# Patient Record
Sex: MALE | Race: WHITE | NOT HISPANIC OR LATINO | Employment: UNEMPLOYED | URBAN - METROPOLITAN AREA
[De-identification: names, ages, dates, MRNs, and addresses within clinical notes are randomized per-mention and may not be internally consistent; named-entity substitution may affect disease eponyms.]

---

## 2024-01-23 ENCOUNTER — HOSPITAL ENCOUNTER (OUTPATIENT)
Facility: MEDICAL CENTER | Age: 24
End: 2024-01-24
Attending: HOSPITALIST | Admitting: HOSPITALIST
Payer: COMMERCIAL

## 2024-01-23 PROBLEM — F20.9 SCHIZOPHRENIA (HCC): Status: ACTIVE | Noted: 2024-01-23

## 2024-01-23 PROBLEM — Z72.0 TOBACCO USE: Status: ACTIVE | Noted: 2024-01-23

## 2024-01-23 PROBLEM — F98.8 ADD (ATTENTION DEFICIT DISORDER): Status: ACTIVE | Noted: 2024-01-23

## 2024-01-23 PROBLEM — R79.89 ELEVATED TROPONIN LEVEL: Status: ACTIVE | Noted: 2024-01-23

## 2024-01-23 PROBLEM — E87.6 HYPOKALEMIA: Status: ACTIVE | Noted: 2024-01-23

## 2024-01-23 PROBLEM — T40.411A ACCIDENTAL FENTANYL OVERDOSE, INITIAL ENCOUNTER (HCC): Status: ACTIVE | Noted: 2024-01-23

## 2024-01-23 LAB
ALBUMIN SERPL BCP-MCNC: 3.6 G/DL (ref 3.2–4.9)
ALBUMIN/GLOB SERPL: 1.9 G/DL
ALP SERPL-CCNC: 84 U/L (ref 30–99)
ALT SERPL-CCNC: 19 U/L (ref 2–50)
ANION GAP SERPL CALC-SCNC: 6 MMOL/L (ref 7–16)
AST SERPL-CCNC: 21 U/L (ref 12–45)
BASOPHILS # BLD AUTO: 0.3 % (ref 0–1.8)
BASOPHILS # BLD: 0.02 K/UL (ref 0–0.12)
BILIRUB SERPL-MCNC: 0.3 MG/DL (ref 0.1–1.5)
BUN SERPL-MCNC: 5 MG/DL (ref 8–22)
CALCIUM ALBUM COR SERPL-MCNC: 8.5 MG/DL (ref 8.5–10.5)
CALCIUM SERPL-MCNC: 8.2 MG/DL (ref 8.5–10.5)
CHLORIDE SERPL-SCNC: 105 MMOL/L (ref 96–112)
CK SERPL-CCNC: 190 U/L (ref 0–154)
CK SERPL-CCNC: 205 U/L (ref 0–154)
CO2 SERPL-SCNC: 27 MMOL/L (ref 20–33)
CREAT SERPL-MCNC: 0.71 MG/DL (ref 0.5–1.4)
EOSINOPHIL # BLD AUTO: 0.29 K/UL (ref 0–0.51)
EOSINOPHIL NFR BLD: 3.8 % (ref 0–6.9)
ERYTHROCYTE [DISTWIDTH] IN BLOOD BY AUTOMATED COUNT: 40.8 FL (ref 35.9–50)
GFR SERPLBLD CREATININE-BSD FMLA CKD-EPI: 133 ML/MIN/1.73 M 2
GLOBULIN SER CALC-MCNC: 1.9 G/DL (ref 1.9–3.5)
GLUCOSE BLD STRIP.AUTO-MCNC: 79 MG/DL (ref 65–99)
GLUCOSE SERPL-MCNC: 79 MG/DL (ref 65–99)
HCT VFR BLD AUTO: 37.7 % (ref 42–52)
HGB BLD-MCNC: 12.8 G/DL (ref 14–18)
IMM GRANULOCYTES # BLD AUTO: 0.03 K/UL (ref 0–0.11)
IMM GRANULOCYTES NFR BLD AUTO: 0.4 % (ref 0–0.9)
LYMPHOCYTES # BLD AUTO: 2.45 K/UL (ref 1–4.8)
LYMPHOCYTES NFR BLD: 32.4 % (ref 22–41)
MCH RBC QN AUTO: 30.5 PG (ref 27–33)
MCHC RBC AUTO-ENTMCNC: 34 G/DL (ref 32.3–36.5)
MCV RBC AUTO: 89.8 FL (ref 81.4–97.8)
MONOCYTES # BLD AUTO: 0.62 K/UL (ref 0–0.85)
MONOCYTES NFR BLD AUTO: 8.2 % (ref 0–13.4)
NEUTROPHILS # BLD AUTO: 4.16 K/UL (ref 1.82–7.42)
NEUTROPHILS NFR BLD: 54.9 % (ref 44–72)
NRBC # BLD AUTO: 0 K/UL
NRBC BLD-RTO: 0 /100 WBC (ref 0–0.2)
PLATELET # BLD AUTO: 154 K/UL (ref 164–446)
PMV BLD AUTO: 9.6 FL (ref 9–12.9)
POTASSIUM SERPL-SCNC: 3.5 MMOL/L (ref 3.6–5.5)
PROT SERPL-MCNC: 5.5 G/DL (ref 6–8.2)
RBC # BLD AUTO: 4.2 M/UL (ref 4.7–6.1)
SODIUM SERPL-SCNC: 138 MMOL/L (ref 135–145)
TROPONIN T SERPL-MCNC: 124 NG/L (ref 6–19)
TROPONIN T SERPL-MCNC: 134 NG/L (ref 6–19)
TROPONIN T SERPL-MCNC: 179 NG/L (ref 6–19)
WBC # BLD AUTO: 7.6 K/UL (ref 4.8–10.8)

## 2024-01-23 PROCEDURE — G0378 HOSPITAL OBSERVATION PER HR: HCPCS

## 2024-01-23 PROCEDURE — 700102 HCHG RX REV CODE 250 W/ 637 OVERRIDE(OP): Performed by: HOSPITALIST

## 2024-01-23 PROCEDURE — 84484 ASSAY OF TROPONIN QUANT: CPT

## 2024-01-23 PROCEDURE — 82550 ASSAY OF CK (CPK): CPT | Mod: 91

## 2024-01-23 PROCEDURE — 80053 COMPREHEN METABOLIC PANEL: CPT

## 2024-01-23 PROCEDURE — A9270 NON-COVERED ITEM OR SERVICE: HCPCS | Performed by: HOSPITALIST

## 2024-01-23 PROCEDURE — 700105 HCHG RX REV CODE 258: Performed by: HOSPITALIST

## 2024-01-23 PROCEDURE — 82962 GLUCOSE BLOOD TEST: CPT

## 2024-01-23 PROCEDURE — 99223 1ST HOSP IP/OBS HIGH 75: CPT | Performed by: HOSPITALIST

## 2024-01-23 PROCEDURE — 85025 COMPLETE CBC W/AUTO DIFF WBC: CPT

## 2024-01-23 PROCEDURE — 36415 COLL VENOUS BLD VENIPUNCTURE: CPT

## 2024-01-23 PROCEDURE — 93005 ELECTROCARDIOGRAM TRACING: CPT | Performed by: HOSPITALIST

## 2024-01-23 RX ORDER — POLYETHYLENE GLYCOL 3350 17 G/17G
1 POWDER, FOR SOLUTION ORAL
Status: DISCONTINUED | OUTPATIENT
Start: 2024-01-23 | End: 2024-01-24 | Stop reason: HOSPADM

## 2024-01-23 RX ORDER — SODIUM CHLORIDE, SODIUM LACTATE, POTASSIUM CHLORIDE, CALCIUM CHLORIDE 600; 310; 30; 20 MG/100ML; MG/100ML; MG/100ML; MG/100ML
INJECTION, SOLUTION INTRAVENOUS CONTINUOUS
Status: DISCONTINUED | OUTPATIENT
Start: 2024-01-23 | End: 2024-01-24 | Stop reason: HOSPADM

## 2024-01-23 RX ORDER — PROMETHAZINE HYDROCHLORIDE 12.5 MG/1
12.5-25 SUPPOSITORY RECTAL EVERY 4 HOURS PRN
Status: DISCONTINUED | OUTPATIENT
Start: 2024-01-23 | End: 2024-01-24 | Stop reason: HOSPADM

## 2024-01-23 RX ORDER — AMOXICILLIN 250 MG
2 CAPSULE ORAL 2 TIMES DAILY
Status: DISCONTINUED | OUTPATIENT
Start: 2024-01-23 | End: 2024-01-24 | Stop reason: HOSPADM

## 2024-01-23 RX ORDER — LABETALOL HYDROCHLORIDE 5 MG/ML
10 INJECTION, SOLUTION INTRAVENOUS EVERY 4 HOURS PRN
Status: ACTIVE | OUTPATIENT
Start: 2024-01-23 | End: 2024-01-23

## 2024-01-23 RX ORDER — MIRTAZAPINE 15 MG/1
30 TABLET, FILM COATED ORAL NIGHTLY
Status: DISCONTINUED | OUTPATIENT
Start: 2024-01-23 | End: 2024-01-24 | Stop reason: HOSPADM

## 2024-01-23 RX ORDER — ACETAMINOPHEN 325 MG/1
650 TABLET ORAL EVERY 6 HOURS PRN
Status: ACTIVE | OUTPATIENT
Start: 2024-01-23 | End: 2024-01-23

## 2024-01-23 RX ORDER — DIVALPROEX SODIUM 500 MG/1
500 TABLET, DELAYED RELEASE ORAL DAILY
Status: DISCONTINUED | OUTPATIENT
Start: 2024-01-23 | End: 2024-01-24 | Stop reason: HOSPADM

## 2024-01-23 RX ORDER — MIRTAZAPINE 30 MG/1
30 TABLET, FILM COATED ORAL NIGHTLY
COMMUNITY

## 2024-01-23 RX ORDER — PROCHLORPERAZINE EDISYLATE 5 MG/ML
5-10 INJECTION INTRAMUSCULAR; INTRAVENOUS EVERY 4 HOURS PRN
Status: DISCONTINUED | OUTPATIENT
Start: 2024-01-23 | End: 2024-01-24 | Stop reason: HOSPADM

## 2024-01-23 RX ORDER — LABETALOL HYDROCHLORIDE 5 MG/ML
10 INJECTION, SOLUTION INTRAVENOUS EVERY 4 HOURS PRN
Status: DISCONTINUED | OUTPATIENT
Start: 2024-01-23 | End: 2024-01-24 | Stop reason: HOSPADM

## 2024-01-23 RX ORDER — POTASSIUM CHLORIDE 20 MEQ/1
40 TABLET, EXTENDED RELEASE ORAL ONCE
Status: COMPLETED | OUTPATIENT
Start: 2024-01-23 | End: 2024-01-23

## 2024-01-23 RX ORDER — ONDANSETRON 2 MG/ML
4 INJECTION INTRAMUSCULAR; INTRAVENOUS EVERY 4 HOURS PRN
Status: DISCONTINUED | OUTPATIENT
Start: 2024-01-23 | End: 2024-01-24 | Stop reason: HOSPADM

## 2024-01-23 RX ORDER — HALOPERIDOL 2 MG/1
4 TABLET ORAL DAILY
Status: DISCONTINUED | OUTPATIENT
Start: 2024-01-23 | End: 2024-01-24 | Stop reason: HOSPADM

## 2024-01-23 RX ORDER — ONDANSETRON 4 MG/1
4 TABLET, ORALLY DISINTEGRATING ORAL EVERY 4 HOURS PRN
Status: DISCONTINUED | OUTPATIENT
Start: 2024-01-23 | End: 2024-01-24 | Stop reason: HOSPADM

## 2024-01-23 RX ORDER — DIVALPROEX SODIUM 500 MG/1
500 TABLET, DELAYED RELEASE ORAL DAILY
COMMUNITY

## 2024-01-23 RX ORDER — ONDANSETRON 2 MG/ML
4 INJECTION INTRAMUSCULAR; INTRAVENOUS EVERY 4 HOURS PRN
Status: ACTIVE | OUTPATIENT
Start: 2024-01-23 | End: 2024-01-23

## 2024-01-23 RX ORDER — HALOPERIDOL 2 MG/1
4 TABLET ORAL DAILY
COMMUNITY

## 2024-01-23 RX ORDER — PROMETHAZINE HYDROCHLORIDE 25 MG/1
12.5-25 TABLET ORAL EVERY 4 HOURS PRN
Status: DISCONTINUED | OUTPATIENT
Start: 2024-01-23 | End: 2024-01-24 | Stop reason: HOSPADM

## 2024-01-23 RX ORDER — NICOTINE 21 MG/24HR
21 PATCH, TRANSDERMAL 24 HOURS TRANSDERMAL
Status: DISCONTINUED | OUTPATIENT
Start: 2024-01-23 | End: 2024-01-24 | Stop reason: HOSPADM

## 2024-01-23 RX ORDER — BISACODYL 10 MG
10 SUPPOSITORY, RECTAL RECTAL
Status: DISCONTINUED | OUTPATIENT
Start: 2024-01-23 | End: 2024-01-24 | Stop reason: HOSPADM

## 2024-01-23 RX ORDER — ACETAMINOPHEN 325 MG/1
650 TABLET ORAL EVERY 6 HOURS PRN
Status: DISCONTINUED | OUTPATIENT
Start: 2024-01-23 | End: 2024-01-24 | Stop reason: HOSPADM

## 2024-01-23 RX ADMIN — HALOPERIDOL 4 MG: 2 TABLET ORAL at 17:31

## 2024-01-23 RX ADMIN — SODIUM CHLORIDE, POTASSIUM CHLORIDE, SODIUM LACTATE AND CALCIUM CHLORIDE: 600; 310; 30; 20 INJECTION, SOLUTION INTRAVENOUS at 21:36

## 2024-01-23 RX ADMIN — SODIUM CHLORIDE, POTASSIUM CHLORIDE, SODIUM LACTATE AND CALCIUM CHLORIDE: 600; 310; 30; 20 INJECTION, SOLUTION INTRAVENOUS at 11:44

## 2024-01-23 RX ADMIN — RIVAROXABAN 10 MG: 10 TABLET, FILM COATED ORAL at 17:31

## 2024-01-23 RX ADMIN — DIVALPROEX SODIUM 500 MG: 500 TABLET, DELAYED RELEASE ORAL at 11:30

## 2024-01-23 RX ADMIN — MIRTAZAPINE 30 MG: 15 TABLET, FILM COATED ORAL at 20:07

## 2024-01-23 RX ADMIN — DOCUSATE SODIUM 50 MG AND SENNOSIDES 8.6 MG 2 TABLET: 8.6; 5 TABLET, FILM COATED ORAL at 17:31

## 2024-01-23 RX ADMIN — POTASSIUM CHLORIDE 40 MEQ: 20 TABLET, EXTENDED RELEASE ORAL at 15:09

## 2024-01-23 RX ADMIN — NICOTINE TRANSDERMAL SYSTEM 21 MG: 21 PATCH, EXTENDED RELEASE TRANSDERMAL at 11:29

## 2024-01-23 ASSESSMENT — ENCOUNTER SYMPTOMS
HEADACHES: 0
VOMITING: 0
SPEECH CHANGE: 0
DIZZINESS: 0
COUGH: 0
ABDOMINAL PAIN: 0
NAUSEA: 0
SHORTNESS OF BREATH: 0
NERVOUS/ANXIOUS: 0
PALPITATIONS: 0
CHILLS: 0
DEPRESSION: 0
BACK PAIN: 0
FEVER: 0
SENSORY CHANGE: 0
STRIDOR: 0

## 2024-01-23 ASSESSMENT — LIFESTYLE VARIABLES
TOTAL SCORE: 0
SUBSTANCE_ABUSE: 1
HAVE PEOPLE ANNOYED YOU BY CRITICIZING YOUR DRINKING: NO
CONSUMPTION TOTAL: NEGATIVE
TOTAL SCORE: 0
HOW MANY TIMES IN THE PAST YEAR HAVE YOU HAD 5 OR MORE DRINKS IN A DAY: 0
ON A TYPICAL DAY WHEN YOU DRINK ALCOHOL HOW MANY DRINKS DO YOU HAVE: 0
EVER FELT BAD OR GUILTY ABOUT YOUR DRINKING: NO
DOES PATIENT WANT TO STOP DRINKING: NO
EVER HAD A DRINK FIRST THING IN THE MORNING TO STEADY YOUR NERVES TO GET RID OF A HANGOVER: NO
HAVE YOU EVER FELT YOU SHOULD CUT DOWN ON YOUR DRINKING: NO
AVERAGE NUMBER OF DAYS PER WEEK YOU HAVE A DRINK CONTAINING ALCOHOL: 0
ALCOHOL_USE: NO
TOTAL SCORE: 0

## 2024-01-23 ASSESSMENT — COGNITIVE AND FUNCTIONAL STATUS - GENERAL
SUGGESTED CMS G CODE MODIFIER DAILY ACTIVITY: CH
SUGGESTED CMS G CODE MODIFIER MOBILITY: CH
DAILY ACTIVITIY SCORE: 24
MOBILITY SCORE: 24

## 2024-01-23 ASSESSMENT — PATIENT HEALTH QUESTIONNAIRE - PHQ9
SUM OF ALL RESPONSES TO PHQ9 QUESTIONS 1 AND 2: 1
1. LITTLE INTEREST OR PLEASURE IN DOING THINGS: NOT AT ALL
6. FEELING BAD ABOUT YOURSELF - OR THAT YOU ARE A FAILURE OR HAVE LET YOURSELF OR YOUR FAMILY DOWN: NOT AL ALL
SUM OF ALL RESPONSES TO PHQ QUESTIONS 1-9: 5
5. POOR APPETITE OR OVEREATING: SEVERAL DAYS
7. TROUBLE CONCENTRATING ON THINGS, SUCH AS READING THE NEWSPAPER OR WATCHING TELEVISION: MORE THAN HALF THE DAYS
9. THOUGHTS THAT YOU WOULD BE BETTER OFF DEAD, OR OF HURTING YOURSELF: NOT AT ALL
3. TROUBLE FALLING OR STAYING ASLEEP OR SLEEPING TOO MUCH: SEVERAL DAYS
2. FEELING DOWN, DEPRESSED, IRRITABLE, OR HOPELESS: SEVERAL DAYS
4. FEELING TIRED OR HAVING LITTLE ENERGY: NOT AT ALL
8. MOVING OR SPEAKING SO SLOWLY THAT OTHER PEOPLE COULD HAVE NOTICED. OR THE OPPOSITE, BEING SO FIGETY OR RESTLESS THAT YOU HAVE BEEN MOVING AROUND A LOT MORE THAN USUAL: NOT AT ALL

## 2024-01-23 ASSESSMENT — COPD QUESTIONNAIRES
HAVE YOU SMOKED AT LEAST 100 CIGARETTES IN YOUR ENTIRE LIFE: YES
DO YOU EVER COUGH UP ANY MUCUS OR PHLEGM?: NO/ONLY WITH OCCASIONAL COLDS OR INFECTIONS
COPD SCREENING SCORE: 2
DURING THE PAST 4 WEEKS HOW MUCH DID YOU FEEL SHORT OF BREATH: NONE/LITTLE OF THE TIME

## 2024-01-23 ASSESSMENT — PAIN DESCRIPTION - PAIN TYPE
TYPE: ACUTE PAIN
TYPE: ACUTE PAIN

## 2024-01-23 NOTE — PROGRESS NOTES
4 Eyes Skin Assessment Completed by MIRZA Garza and MIRZA Moreno.    Head WDL  Ears WDL  Nose WDL  Mouth WDL  Neck WDL  Breast/Chest Redness and Blanching  Shoulder Blades Redness and Blanching  Spine WDL  (R) Arm/Elbow/Hand WDL  (L) Arm/Elbow/Hand WDL  Abdomen WDL  Groin WDL  Scrotum/Coccyx/Buttocks Redness, Blanching, and Scab  (R) Leg Redness and Blanching  (L) Leg Redness and Blanching  (R) Heel/Foot/Toe Redness and Blanching  (L) Heel/Foot/Toe Redness and Blanching          Devices In Places Tele Box, Blood Pressure Cuff, and Pulse Ox      Interventions In Place Pillows and Low Air Loss Mattress    Possible Skin Injury No    Pictures Uploaded Into Epic N/A  Wound Consult Placed N/A  RN Wound Prevention Protocol Ordered No

## 2024-01-23 NOTE — PROGRESS NOTES
Pt arrived with EMS to [facility]. Report received from RN. Pt placed on tele box and monitor room notified. Pt care assumed, assessment completed. Pt is A&Ox4, pain 0/10, SR on the monitor. Updated on POC, questions answered. Bed in lowest, locked position, treaded socks on, call light and belongings within reach. Fall precautions in place.    0530: during admission questions pt stated he had drugs on him. Security called to search through belongings.

## 2024-01-23 NOTE — PROGRESS NOTES
Triage officer note    Sending facility [facility]  Sending Physician Dr Choudhary    Patient apparently abuses methamphetamine, marijuana and fentanyl.  He was apparently smoking fentanyl and it was too much which she did not realize and he became confused and went to the local emergency room.  There they evaluated him and noticed that his troponin was elevated and kept monitoring the troponin levels which keep rising.  Cardiology was involved Dr. Barry recommends transfer the patient here for monitoring of his enzymes.  Patient will need most likely echocardiogram and monitoring of troponin levels.  Apparently he was not suicidal and he smokes fentanyl all the time.

## 2024-01-23 NOTE — PROGRESS NOTES
Tech from Lab called with critical result of troponin 179 at 1221. Critical lab result read back to tech.   Dr. Levy notified of critical lab result at 1223.  Critical lab result read back by Dr. Levy.

## 2024-01-24 ENCOUNTER — APPOINTMENT (OUTPATIENT)
Dept: CARDIOLOGY | Facility: MEDICAL CENTER | Age: 24
End: 2024-01-24
Attending: HOSPITALIST
Payer: COMMERCIAL

## 2024-01-24 VITALS
BODY MASS INDEX: 21.56 KG/M2 | SYSTOLIC BLOOD PRESSURE: 146 MMHG | HEART RATE: 88 BPM | OXYGEN SATURATION: 99 % | TEMPERATURE: 98.1 F | DIASTOLIC BLOOD PRESSURE: 88 MMHG | WEIGHT: 162.7 LBS | HEIGHT: 73 IN | RESPIRATION RATE: 16 BRPM

## 2024-01-24 PROBLEM — R79.89 ELEVATED TROPONIN LEVEL: Status: RESOLVED | Noted: 2024-01-23 | Resolved: 2024-01-24

## 2024-01-24 PROBLEM — E87.6 HYPOKALEMIA: Status: RESOLVED | Noted: 2024-01-23 | Resolved: 2024-01-24

## 2024-01-24 PROBLEM — T40.411A ACCIDENTAL FENTANYL OVERDOSE, INITIAL ENCOUNTER (HCC): Status: RESOLVED | Noted: 2024-01-23 | Resolved: 2024-01-24

## 2024-01-24 LAB
ALBUMIN SERPL BCP-MCNC: 4.1 G/DL (ref 3.2–4.9)
ALBUMIN/GLOB SERPL: 1.5 G/DL
ALP SERPL-CCNC: 93 U/L (ref 30–99)
ALT SERPL-CCNC: 15 U/L (ref 2–50)
ANION GAP SERPL CALC-SCNC: 12 MMOL/L (ref 7–16)
AST SERPL-CCNC: 21 U/L (ref 12–45)
BILIRUB SERPL-MCNC: 0.3 MG/DL (ref 0.1–1.5)
BUN SERPL-MCNC: 6 MG/DL (ref 8–22)
CALCIUM ALBUM COR SERPL-MCNC: 8.8 MG/DL (ref 8.5–10.5)
CALCIUM SERPL-MCNC: 8.9 MG/DL (ref 8.5–10.5)
CHLORIDE SERPL-SCNC: 103 MMOL/L (ref 96–112)
CO2 SERPL-SCNC: 25 MMOL/L (ref 20–33)
CREAT SERPL-MCNC: 0.75 MG/DL (ref 0.5–1.4)
EKG IMPRESSION: NORMAL
EKG IMPRESSION: NORMAL
ERYTHROCYTE [DISTWIDTH] IN BLOOD BY AUTOMATED COUNT: 40.4 FL (ref 35.9–50)
GFR SERPLBLD CREATININE-BSD FMLA CKD-EPI: 130 ML/MIN/1.73 M 2
GLOBULIN SER CALC-MCNC: 2.7 G/DL (ref 1.9–3.5)
GLUCOSE SERPL-MCNC: 88 MG/DL (ref 65–99)
HCT VFR BLD AUTO: 43.3 % (ref 42–52)
HGB BLD-MCNC: 14.8 G/DL (ref 14–18)
LV EJECT FRACT  99904: 65
LV EJECT FRACT MOD 2C 99903: 61.55
LV EJECT FRACT MOD 4C 99902: 64.42
LV EJECT FRACT MOD BP 99901: 64.61
MCH RBC QN AUTO: 30.4 PG (ref 27–33)
MCHC RBC AUTO-ENTMCNC: 34.2 G/DL (ref 32.3–36.5)
MCV RBC AUTO: 88.9 FL (ref 81.4–97.8)
PLATELET # BLD AUTO: 173 K/UL (ref 164–446)
PMV BLD AUTO: 9.4 FL (ref 9–12.9)
POTASSIUM SERPL-SCNC: 4.1 MMOL/L (ref 3.6–5.5)
PROT SERPL-MCNC: 6.8 G/DL (ref 6–8.2)
RBC # BLD AUTO: 4.87 M/UL (ref 4.7–6.1)
SODIUM SERPL-SCNC: 140 MMOL/L (ref 135–145)
WBC # BLD AUTO: 7.2 K/UL (ref 4.8–10.8)

## 2024-01-24 PROCEDURE — 93010 ELECTROCARDIOGRAM REPORT: CPT | Performed by: INTERNAL MEDICINE

## 2024-01-24 PROCEDURE — 700102 HCHG RX REV CODE 250 W/ 637 OVERRIDE(OP): Performed by: HOSPITALIST

## 2024-01-24 PROCEDURE — 93306 TTE W/DOPPLER COMPLETE: CPT

## 2024-01-24 PROCEDURE — G0378 HOSPITAL OBSERVATION PER HR: HCPCS

## 2024-01-24 PROCEDURE — 99238 HOSP IP/OBS DSCHRG MGMT 30/<: CPT | Performed by: HOSPITALIST

## 2024-01-24 PROCEDURE — 80053 COMPREHEN METABOLIC PANEL: CPT

## 2024-01-24 PROCEDURE — 700105 HCHG RX REV CODE 258: Performed by: HOSPITALIST

## 2024-01-24 PROCEDURE — A9270 NON-COVERED ITEM OR SERVICE: HCPCS | Performed by: HOSPITALIST

## 2024-01-24 PROCEDURE — 36415 COLL VENOUS BLD VENIPUNCTURE: CPT

## 2024-01-24 PROCEDURE — 93306 TTE W/DOPPLER COMPLETE: CPT | Mod: 26 | Performed by: INTERNAL MEDICINE

## 2024-01-24 PROCEDURE — 85027 COMPLETE CBC AUTOMATED: CPT

## 2024-01-24 RX ADMIN — DIVALPROEX SODIUM 500 MG: 500 TABLET, DELAYED RELEASE ORAL at 05:30

## 2024-01-24 RX ADMIN — NICOTINE TRANSDERMAL SYSTEM 21 MG: 21 PATCH, EXTENDED RELEASE TRANSDERMAL at 05:30

## 2024-01-24 RX ADMIN — DOCUSATE SODIUM 50 MG AND SENNOSIDES 8.6 MG 2 TABLET: 8.6; 5 TABLET, FILM COATED ORAL at 05:30

## 2024-01-24 RX ADMIN — SODIUM CHLORIDE, POTASSIUM CHLORIDE, SODIUM LACTATE AND CALCIUM CHLORIDE: 600; 310; 30; 20 INJECTION, SOLUTION INTRAVENOUS at 07:26

## 2024-01-24 ASSESSMENT — FIBROSIS 4 INDEX: FIB4 SCORE: 0.69

## 2024-01-24 NOTE — PROGRESS NOTES
Bedside report received from day RN Citlaly, pt care assumed, assessment completed. Pt is A&Ox4, pain 0/10, SR on the monitor. Updated on POC, questions answered. Bed in lowest, locked position, treaded socks on, call light and belongings within reach. Fall precautions in place.

## 2024-01-24 NOTE — DISCHARGE PLANNING
LMSW spoke with Kavitha with Medical transportation and they do not have a /provider to transport pt today but they are able to transport him tomorrow. Pt has Medical transportation benefits.     [employer] to [place] was ordered.

## 2024-01-24 NOTE — PROGRESS NOTES
IV removed telebox off. Discussed the pt's discharge paperwork and he declined any questions. Uber was ordered and was [facility] or ED if having difficulty breathing.

## 2024-01-24 NOTE — ASSESSMENT & PLAN NOTE
CPK minimally elevated but should not be causing current issue.  Likely secondary to demand ischemia.  Unknown how long he was down before being found.  Likely hypoxic event  Unknown family history.  EKG reviewed and unremarkable  Second troponin here at [facility] downtrending  Monitor on telemetry  Checking echocardiogram  Could be possible of history of methamphetamine abuse causing cardiac issue.

## 2024-01-24 NOTE — PROGRESS NOTES
Monitor Summary:   Rhythm: sr  Rate: 64-89  Measurement: .12/.09/.39  Ectopy: rare PVC's, PAC's, non sustained Junctional Rhythm

## 2024-01-24 NOTE — PROGRESS NOTES
Pt went with Uber in [place]  and  josé antonio to residence in [facility].

## 2024-01-24 NOTE — ASSESSMENT & PLAN NOTE
Continue outpatient medications  No current exacerbation at this point in time he is alert and oriented and able to follow discussion quite well.

## 2024-01-24 NOTE — DISCHARGE INSTRUCTIONS
Discharge Instructions per Twan Levy D.O.    DIET: I have recommended a healthy balanced diet    ACTIVITY: Remain as active as you can be no strenuous activity over next week but recommend reasonable activity during this next week and increase it as tolerates.  I have recommended physical outlets instead of illicit drug use with him.  Recommend no further illicit drug use.  Stay away from cigarettes, alcohol and illicit drugs.  Recommend healthy choices    DIAGNOSIS: Unintentional drug overdose with fentanyl with subsequent elevated heart enzyme troponin secondary to demand ischemia from hypoxia most likely.    Return to ER if any further chest pain or concerns

## 2024-01-24 NOTE — H&P
"Hospital Medicine History & Physical Note    Date of Service  1/23/2024    Primary Care Physician  In [facility].      Code Status  Full Code    Chief Complaint  Transfer from [facility] after unintentional drug overdose and elevated troponins    History of Presenting Illness  Jose Chávez is a 23 y.o. male with polysubstance abuse, schizophrenia, attention deficit disorder that was admitted 1/23/2024 up at [facility] with concern of drug overdose.  At outlying facility they noted that his troponins were elevated.  Cardiology had recommended transfer to [facility] for further evaluation and echocardiogram.    1/23/2024: Patient is awake and alert.  He is remorseful for attempting to use fentanyl.  He states he went to his friend's house to obtain methamphetamines but they were out but did have fentanyl.  He states he grabbed it and went out to the nearby woods and had \"2 hits\" got up to walk away while in the woods and passed out.  He states the next thing he knew was that he was at the hospital.  Is unclear who found him or how he got to the hospital.  He denies any suicidal ideation.  He denies any current chest pain or heart issues.  We had a long discussion on cessation of illicit drug use and seeking help via rehab or help groups such as AA/NA.  We discussed healthy choices.    I discussed the plan of care with patient.    Review of Systems  Review of Systems   Constitutional:  Negative for chills and fever.   Respiratory:  Negative for cough, shortness of breath and stridor.    Cardiovascular:  Negative for chest pain, palpitations and leg swelling.   Gastrointestinal:  Negative for abdominal pain, nausea and vomiting.   Genitourinary:  Negative for dysuria.   Musculoskeletal:  Negative for back pain and joint pain.   Neurological:  Negative for dizziness, sensory change, speech change and headaches.   Psychiatric/Behavioral:  Positive for substance abuse. Negative for depression and suicidal " ideas. The patient is not nervous/anxious.        Past Medical History  Attention deficit disorder  Schizophrenia  Polysubstance abuse  Tobacco use    Surgical History  Chin surgery    Family History  Patient states he was adopted does not know his parents or family history.  Family history reviewed with patient. There is no family history that is pertinent to the chief complaint.     Social History   Single and has no children.  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  States smoking 3 to 5 cigarettes daily, uses methamphetamines and marijuana.  Denies any heavy alcohol use    Allergies  Allergies   Allergen Reactions    Shellfish-Derived Products Swelling     Crawfish/Crawdads cause throat swelling       Medications  Prior to Admission Medications   Prescriptions Last Dose Informant Patient Reported? Taking?   divalproex (DEPAKOTE) 500 MG Tablet Delayed Response 1/22/2024  Yes Yes   Sig: Take 500 mg by mouth every day. Indications: Schizophrenia   haloperidol (HALDOL) 2 MG Tab 1/21/2024  Yes Yes   Sig: Take 4 mg by mouth every day at 6 PM. Indications: Problems with Behavior   mirtazapine (REMERON) 30 MG Tab tablet 1/21/2024  Yes Yes   Sig: Take 30 mg by mouth every evening. Indications: Insomnia per pt      Facility-Administered Medications: None       Physical Exam  Temp:  [36.8 °C (98.2 °F)-37.1 °C (98.8 °F)] 36.8 °C (98.2 °F)  Pulse:  [74-95] 85  Resp:  [16-17] 16  BP: (115-130)/(63-80) 130/80  SpO2:  [93 %-97 %] 95 %  Blood Pressure: 130/80   Temperature: 36.8 °C (98.2 °F)   Pulse: 85   Respiration: 16   Pulse Oximetry: 95 %       Physical Exam  Vitals reviewed.   Constitutional:       Appearance: Normal appearance. He is not diaphoretic.   HENT:      Head: Normocephalic and atraumatic.      Nose: Nose normal.      Mouth/Throat:      Mouth: Mucous membranes are moist.      Pharynx: No oropharyngeal exudate.   Eyes:      General: No scleral icterus.        Right eye: No discharge.         Left eye: No discharge.       "Extraocular Movements: Extraocular movements intact.      Conjunctiva/sclera: Conjunctivae normal.   Cardiovascular:      Rate and Rhythm: Normal rate and regular rhythm.      Pulses:           Radial pulses are 2+ on the right side and 2+ on the left side.        Dorsalis pedis pulses are 2+ on the right side and 2+ on the left side.      Heart sounds: No murmur heard.  Pulmonary:      Effort: Pulmonary effort is normal. No respiratory distress.      Breath sounds: Normal breath sounds. No wheezing or rales.   Abdominal:      General: Bowel sounds are normal. There is no distension.      Palpations: Abdomen is soft.      Tenderness: There is no abdominal tenderness.   Musculoskeletal:         General: No swelling or tenderness.      Cervical back: No tenderness. No muscular tenderness.      Right lower leg: No edema.      Left lower leg: No edema.   Lymphadenopathy:      Cervical: No cervical adenopathy.   Skin:     Coloration: Skin is not jaundiced or pale.   Neurological:      General: No focal deficit present.      Mental Status: He is alert and oriented to person, place, and time. Mental status is at baseline.      Cranial Nerves: No cranial nerve deficit.   Psychiatric:         Mood and Affect: Mood normal.         Behavior: Behavior normal.         Laboratory:  Recent Labs     01/23/24  1135   WBC 7.6   RBC 4.20*   HEMOGLOBIN 12.8*   HEMATOCRIT 37.7*   MCV 89.8   MCH 30.5   MCHC 34.0   RDW 40.8   PLATELETCT 154*   MPV 9.6     Recent Labs     01/23/24  1135   SODIUM 138   POTASSIUM 3.5*   CHLORIDE 105   CO2 27   GLUCOSE 79   BUN 5*   CREATININE 0.71   CALCIUM 8.2*     Recent Labs     01/23/24  1135   ALTSGPT 19   ASTSGOT 21   ALKPHOSPHAT 84   TBILIRUBIN 0.3   GLUCOSE 79         No results for input(s): \"NTPROBNP\" in the last 72 hours.      Recent Labs     01/23/24  1135 01/23/24  1507   TROPONINT 179* 134*       Imaging:  EC-ECHOCARDIOGRAM COMPLETE W/O CONT    (Results Pending) "         Assessment/Plan:  Justification for Admission Status  I anticipate this patient is appropriate for observation status at this time because fentanyl overdose and subsequent elevation of troponins    Patient will need a Telemetry bed on MEDICAL service .  The need is secondary to monitor for any possible arrhythmia status post illicit drug use..    * Accidental fentanyl overdose, initial encounter ([facility])- (present on admission)  Assessment & Plan  Patient states he was initially seeking methamphetamine use but when his dealer did not have it only had fentanyl he decided to try that.  I have alerted him to the severity of illicit drugs and to the potency of fentanyl.  I have recommended Narcan to take home with him and recommend if he does have to use narcotics in the future to at least have somebody nearby that could hopefully give it to him in case he became unresponsive.  Again I encouraged illicit drug/substance abuse altogether.  Encouraged seeking help groups such as AA or NA.    Schizophrenia ([facility])  Assessment & Plan  Continue outpatient medications  No current exacerbation at this point in time he is alert and oriented and able to follow discussion quite well.    Tobacco use  Assessment & Plan  Cessation advised  Nicotine patch 7 mg daily if needed    Hypokalemia  Assessment & Plan  Replace and monitor    Elevated troponin level  Assessment & Plan  CPK minimally elevated but should not be causing current issue.  Likely secondary to demand ischemia.  Unknown how long he was down before being found.  Likely hypoxic event  Unknown family history.  EKG reviewed and unremarkable  Second troponin here at [facility] downtrending  Monitor on telemetry        VTE prophylaxis: SCDs/TEDs

## 2024-01-24 NOTE — ASSESSMENT & PLAN NOTE
Patient states he was initially seeking methamphetamine use but when his dealer did not have it only had fentanyl he decided to try that.  I have alerted him to the severity of illicit drugs and to the potency of fentanyl.  I have recommended Narcan to take home with him and recommend if he does have to use narcotics in the future to at least have somebody nearby that could hopefully give it to him in case he became unresponsive.  Again I encouraged illicit drug/substance abuse altogether.  Encouraged seeking help groups such as AA or NA.

## 2024-01-24 NOTE — DISCHARGE SUMMARY
Discharge Summary    CHIEF COMPLAINT ON ADMISSION  No chief complaint on file.      Reason for Admission  Fentanyl overdose     Admission Date  1/23/2024    CODE STATUS  Full Code    HPI & HOSPITAL COURSE  Jose is a 23-year-old male that [place] near [facility].  He had a misadventure in going to one of his friend's house to get some methamphetamines however they did not have any instead they had fentanyl.  He took the fentanyl went out into the woods and states he smoked it took about 3-4 steps and he does not recall anything thereafter.  He is unsure who found him but per records at [facility] he was found by passerby or friends and brought him to the hospital.  Improved with Narcan being given.  This found that his initial troponin was elevated.  Due to concern of elevated troponin with a rise on second lab draw he was sent to [facility].  Here he had elevated troponins but they are downtrending.  An echocardiogram on date of discharge was unremarkable.  Patient was alert and oriented he denied any chest pain or shortness of breath.  I had a long discussion with the patient regarding illicit drug use life making decisions and healthy choices.  He promised me that he was done with illicit drugs.  Nonetheless we have prescribed him Narcan and fentanyl test strips just in case.  I recommend that he stop smoking work out more with increase of physical activity and seek help at rehab or AA if he needs it.  I recommend that he follow-up with his family care provider    Therefore, he is discharged in good and stable condition to home with close outpatient follow-up.    The patient recovered much more quickly than anticipated on admission.    Discharge Date  1/24/2024    FOLLOW UP ITEMS POST DISCHARGE  None    DISCHARGE DIAGNOSES  Principal Problem (Resolved):    Accidental fentanyl overdose, initial encounter ([facility]) (POA: Yes)  Active Problems:     Tobacco use (POA: Unknown)    Schizophrenia (HCC) (POA: Unknown)    ADD (attention deficit disorder) (POA: Unknown)  Resolved Problems:    Elevated troponin level (POA: Unknown)    Hypokalemia (POA: Unknown)      FOLLOW UP  No future appointments.  Your primary care    Follow up in 1 month(s)        MEDICATIONS ON DISCHARGE     Medication List        CONTINUE taking these medications        Instructions   divalproex 500 MG Tbec  Commonly known as: Depakote   Take 500 mg by mouth every day. Indications: Schizophrenia  Dose: 500 mg     haloperidol 2 MG Tabs  Commonly known as: Haldol   Take 4 mg by mouth every day at 6 PM. Indications: Problems with Behavior  Dose: 4 mg     mirtazapine 30 MG Tabs tablet  Commonly known as: Remeron   Take 30 mg by mouth every evening. Indications: Insomnia per pt  Dose: 30 mg              Allergies  Allergies   Allergen Reactions    Shellfish-Derived Products Swelling     Crawfish/Crawdads cause throat swelling       DIET  Orders Placed This Encounter   Procedures    Diet Order Diet: Regular     Standing Status:   Standing     Number of Occurrences:   1     Order Specific Question:   Diet:     Answer:   Regular [1]       ACTIVITY  As tolerated.  Weight bearing as tolerated    CONSULTATIONS  none    PROCEDURES  none    LABORATORY  Lab Results   Component Value Date    SODIUM 140 01/24/2024    POTASSIUM 4.1 01/24/2024    CHLORIDE 103 01/24/2024    CO2 25 01/24/2024    GLUCOSE 88 01/24/2024    BUN 6 (L) 01/24/2024    CREATININE 0.75 01/24/2024        Lab Results   Component Value Date    WBC 7.2 01/24/2024    HEMOGLOBIN 14.8 01/24/2024    HEMATOCRIT 43.3 01/24/2024    PLATELETCT 173 01/24/2024      EC-ECHOCARDIOGRAM COMPLETE W/O CONT   Final Result      CONCLUSIONS  No prior study is available for comparison.      The left ventricular ejection fraction is visually estimated to be 65%.  Normal right ventricular size and systolic function.  Trace mitral regurgitation.  Mild tricuspid  regurgitation.  Trivial pericardial effusion.      Total time of the discharge process exceeds 28 minutes.

## 2024-01-24 NOTE — CARE PLAN
The patient is Stable - Low risk of patient condition declining or worsening    Shift Goals  Clinical Goals: control N/V, stable neuro status, VSS  Patient Goals: rest, comfort, shower  Family Goals: updates    Progress made toward(s) clinical / shift goals:    Problem: Knowledge Deficit - Standard  Goal: Patient and family/care givers will demonstrate understanding of plan of care, disease process/condition, diagnostic tests and medications  Outcome: Progressing  Note: Pt educated on POC, medications, and treatment plan. Pt verbalizes understanding and all questions answered.      Problem: Fall Risk  Goal: Patient will remain free from falls  Outcome: Progressing  Note: Pt has remained free from falls throughout the shift. Fall precautions in place. Pt educated to use call light for assistance.